# Patient Record
Sex: MALE | Race: WHITE | NOT HISPANIC OR LATINO | Employment: OTHER | URBAN - NONMETROPOLITAN AREA
[De-identification: names, ages, dates, MRNs, and addresses within clinical notes are randomized per-mention and may not be internally consistent; named-entity substitution may affect disease eponyms.]

---

## 2024-09-30 ENCOUNTER — OFFICE VISIT (OUTPATIENT)
Dept: CARDIOLOGY | Facility: CLINIC | Age: 74
End: 2024-09-30
Payer: MEDICARE

## 2024-09-30 VITALS
HEART RATE: 67 BPM | BODY MASS INDEX: 29.03 KG/M2 | DIASTOLIC BLOOD PRESSURE: 86 MMHG | WEIGHT: 196 LBS | OXYGEN SATURATION: 98 % | SYSTOLIC BLOOD PRESSURE: 138 MMHG | HEIGHT: 69 IN

## 2024-09-30 DIAGNOSIS — G47.19 EXCESSIVE DAYTIME SLEEPINESS: ICD-10-CM

## 2024-09-30 DIAGNOSIS — I10 PRIMARY HYPERTENSION: ICD-10-CM

## 2024-09-30 DIAGNOSIS — G47.33 OSA (OBSTRUCTIVE SLEEP APNEA): Primary | ICD-10-CM

## 2024-09-30 PROCEDURE — 3079F DIAST BP 80-89 MM HG: CPT | Performed by: NURSE PRACTITIONER

## 2024-09-30 PROCEDURE — 1160F RVW MEDS BY RX/DR IN RCRD: CPT | Performed by: NURSE PRACTITIONER

## 2024-09-30 PROCEDURE — 1159F MED LIST DOCD IN RCRD: CPT | Performed by: NURSE PRACTITIONER

## 2024-09-30 PROCEDURE — 99204 OFFICE O/P NEW MOD 45 MIN: CPT | Performed by: NURSE PRACTITIONER

## 2024-09-30 PROCEDURE — 3075F SYST BP GE 130 - 139MM HG: CPT | Performed by: NURSE PRACTITIONER

## 2024-09-30 RX ORDER — IBUPROFEN 800 MG/1
800 TABLET, FILM COATED ORAL 3 TIMES DAILY
COMMUNITY
Start: 2024-09-26

## 2024-09-30 RX ORDER — CETIRIZINE HYDROCHLORIDE 10 MG/1
10 TABLET ORAL DAILY
COMMUNITY

## 2024-09-30 RX ORDER — PRAVASTATIN SODIUM 40 MG
40 TABLET ORAL DAILY
COMMUNITY
Start: 2024-09-26

## 2024-09-30 RX ORDER — PANTOPRAZOLE SODIUM 40 MG/1
40 TABLET, DELAYED RELEASE ORAL DAILY
COMMUNITY
Start: 2024-09-26

## 2024-09-30 RX ORDER — PROPRANOLOL HCL 10 MG
5 TABLET ORAL
COMMUNITY
Start: 2024-09-26

## 2024-09-30 NOTE — ASSESSMENT & PLAN NOTE
Patient reports that he is frequently fatigued and drowsy during the day.    He has an [place] sleepiness scale of 12.    He is a CPAP user but noted that his PAP device may be approaching 9 years old.  In addition he has not recently had any new CPAP supplies.    Plan:    Prescription to DME of patient's choice for new CPAP device and CPAP supplies.    Reevaluate symptoms on new CPAP more than 1 month.

## 2024-09-30 NOTE — ASSESSMENT & PLAN NOTE
Known history of sleep apnea.  Original study was years ago at [facility] in [facility].    He is on CPAP therapy.  Download reviewed with good control and good compliance.    He is benefiting from PAP therapy.  We plan to continue PAP therapy.    Noted that his device is a system 1 and may be near 9 years old.  He thought his device last was replaced in 2015.    He reports that he needs a new machine and supplies.    Plan:    Prescription to DME of patient's choice for new auto CPAP 10 to 16 cm and CPAP supplies.    Follow-up for a visit on new CPAP when he is had it more than 1 month but less than 3 months.    Discussed that original sleep study may need to be located to get a new Pap.  If unable to find original sleep study then we may need to repeat an in lab study for a baseline AHI.  Patient is willing to go to [facility] if needed

## 2024-09-30 NOTE — ASSESSMENT & PLAN NOTE
Blood pressure today 138/86.  This is borderline control.    He is advised to continue his current blood pressure medication regimen.  He is advised to continue to follow-up with his prescribing provider.  He is advised to have his very old CPAP replaced as untreated or undertreated sleep apnea may potentiate his hypertension.    We will plan to recheck blood pressure at next follow-up visit

## 2024-09-30 NOTE — PROGRESS NOTES
New Sleep Consult     Date:   2024  Name: Rufino Brand  :   1950  PCP: Kenneth Auguste DO    Chief Complaint   Patient presents with    Establish Care     Neck size: 16in       Subjective     History of Present Illness  Rufino Brand is a 74 y.o. male who presents today for establishing care as a new sleep apnea patient.  He reports that he has a long history of sleep apnea.  He had seen Dr. Childress at [facility] in the past.  Reports his CPAP was replaced about  and he has been using the same device since that time.  He reports that he buys his supplies for cash at times but he has not had a new mask for a while.  He reports he needs a new mask immediately and would like to also have a new PAP device.    He reports that without his PAP machine that he has difficulty falling asleep, difficulty staying asleep, snoring loudly and continuously every night.  He reports that with his CPAP device he does much better and sleeps better.    He reports that he goes to bed around 10:30 PM and he will awaken for the day around 5:30 AM.  He reports he normally sleeps about 7 hours per night.  He is retired.    Device download:            Sleep history and testing:    LAN unknown baseline on sleep study at [facility] and [facility].    Current LAN therapy auto CPAP 10 to 16 cm    Coexisting conditions: Hypertension, hyperlipidemia, heart block status post pacemaker    Further details are as follows:    Neck Measurement: 16  inches    Tonica Scale is (out of 24):  12.  He reports a slight chance of dozing off if he is sitting in active in public or in a meeting, if he is riding as a passenger in the car or sitting quietly after lunch.  He reports a moderate chance of dozing off if he is sitting and reading, watching television or if he went and lay down in his bed if the circumstances permitted.    Estimated average amount of sleep per night: 7  Number of times  he wakes up at night: 2  Difficulty falling back asleep: no  It usually takes 30 minutes to go to sleep.  He feels sleepy upon waking up: no  Rotating or night shift work: no    Drowsiness/Sleepiness:  He exhibits the following:  excessive daytime sleepiness  excessive daytime fatigue  falls asleep watching TV  falls asleep during times of the day when he is quiet    Snoring/Breathing:  He exhibits the following:  loud snoring, snores in all sleep positions, and quits breathing at night    Head Injury:  He exhibits the following:  No    Reflux:  He describes the following:  eats 2 meals daily     Narcolepsy:  He exhibits the following:  none    RLS/PLMs:  He describes the following:  none    Insomnia:  He describes the following: Only a problem without CPAP  problems initiating sleep at night  frequent awakenings  bothered by pain at night    Parasomnia:  He exhibits the following:  grinds teeth    Weight:       09/30/24  1255   Weight: 88.9 kg (196 lb)      Weight change in the last year: Patient reports weight is stable.      The patient's relevant past medical, surgical, family, and social history reviewed and updated in Epic as appropriate.    Past Medical History:   Diagnosis Date    Coronary artery disease     Diabetes mellitus     GERD (gastroesophageal reflux disease)     Hyperlipidemia     Hypertension     Prostate cancer      Past Surgical History:   Procedure Laterality Date    CARDIAC CATHETERIZATION      CYSTOSCOPY W/ LASER LITHOTRIPSY      FLEXIBLE SIGMOIDOSCOPY      PACEMAKER IMPLANTATION         No Known Allergies  Prior to Admission medications    Medication Sig Start Date End Date Taking? Authorizing Provider   ibuprofen (ADVIL,MOTRIN) 800 MG tablet Take 1 tablet by mouth 3 (Three) Times a Day. 9/26/24  Yes ProviderMelany MD   pantoprazole (PROTONIX) 40 MG EC tablet Take 1 tablet by mouth Daily. 9/26/24  Yes ProviderMelany MD   pravastatin (PRAVACHOL) 40 MG tablet Take 1 tablet by  "mouth Daily. 9/26/24  Yes Melany Randolph MD   propranolol (INDERAL) 10 MG tablet Take 0.5 tablets by mouth. 9/26/24  Yes Melany Randolph MD   Aspirin 81 MG capsule Take 1 capsule by mouth Daily.    ProviderMelany MD   cetirizine (zyrTEC) 10 MG tablet Take 1 tablet by mouth Daily.    ProviderMelany MD     Family History   Problem Relation Age of Onset    Cancer Mother     Heart failure Father     Hypertension Father     Heart disease Father     Cancer Father     Hyperlipidemia Father     Alzheimer's disease Father     Coronary artery disease Father     Heart disease Brother        Objective     Vital Signs:  /86   Pulse 67   Ht 175.3 cm (69\")   Wt 88.9 kg (196 lb)   SpO2 98%   BMI 28.94 kg/m²     BMI is >= 25 and <30. (Overweight) The following options were offered after discussion;: referral to primary care        Physical Exam  Constitutional:       Appearance: Normal appearance. He is well-developed.   HENT:      Head: Normocephalic and atraumatic.      Nose: Nose normal.      Mouth/Throat:      Mouth: Mucous membranes are moist.   Eyes:      General: No scleral icterus.     Pupils: Pupils are equal, round, and reactive to light.   Neck:      Vascular: No carotid bruit.   Cardiovascular:      Rate and Rhythm: Normal rate and regular rhythm.      Pulses: Normal pulses.           Radial pulses are 2+ on the right side and 2+ on the left side.        Dorsalis pedis pulses are 2+ on the right side and 2+ on the left side.        Posterior tibial pulses are 2+ on the right side and 2+ on the left side.      Heart sounds: Normal heart sounds. No murmur heard.  Pulmonary:      Effort: Pulmonary effort is normal.      Breath sounds: Normal breath sounds. No wheezing or rhonchi.   Abdominal:      General: Bowel sounds are normal.   Musculoskeletal:      Right lower leg: No edema.      Left lower leg: No edema.   Skin:     General: Skin is warm and dry.      Capillary Refill: Capillary " refill takes less than 2 seconds.      Coloration: Skin is not cyanotic.      Nails: There is no clubbing.   Neurological:      Mental Status: He is alert and oriented to person, place, and time.      Motor: No weakness.      Gait: Gait normal.   Psychiatric:         Mood and Affect: Mood normal.         Behavior: Behavior normal. Behavior is cooperative.         Thought Content: Thought content normal.         Cognition and Memory: Memory normal.         The following data was reviewed by: DAVONTE Jones on 09/30/2024:    Labs reviewed: 9/30/2024 lipids, CMP, TSH, PAP download reviewed: 30-day download as above.  I have reviewed and interpreted the data on the download at today's visit, and 9/26/2024 office visit from family physician Dr. Kenneth Auguste          Assessment and Plan     Rufino Brand is a 74 y.o. male who presents for further evaluation of known history of sleep apnea with a very old PAP device.  Concerns his PAP device may not be functioning well and does not have appropriately functioning equipment.  We will order a new CPAP and attempt to obtain records from past sleep apnea testing for you.  The patient will return for follow-up on new CPAP.  I have discussed weight loss as it pertains to obstructive sleep apnea.    Diagnoses and all orders for this visit:    1. LAN (obstructive sleep apnea) (Primary)  Assessment & Plan:  Known history of sleep apnea.  Original study was years ago at [facility] in [facility].    He is on CPAP therapy.  Download reviewed with good control and good compliance.    He is benefiting from PAP therapy.  We plan to continue PAP therapy.    Noted that his device is a system 1 and may be near 9 years old.  He thought his device last was replaced in 2015.    He reports that he needs a new machine and supplies.    Plan:    Prescription to DME of patient's choice for new auto CPAP 10 to 16 cm and CPAP supplies.    Follow-up for a visit on new  CPAP when he is had it more than 1 month but less than 3 months.    Discussed that original sleep study may need to be located to get a new Pap.  If unable to find original sleep study then we may need to repeat an in lab study for a baseline AHI.  Patient is willing to go to [facility] if needed    Orders:  -     PAP Therapy    2. Excessive daytime sleepiness  Assessment & Plan:  Patient reports that he is frequently fatigued and drowsy during the day.    He has an [place] sleepiness scale of 12.    He is a CPAP user but noted that his PAP device may be approaching 9 years old.  In addition he has not recently had any new CPAP supplies.    Plan:    Prescription to DME of patient's choice for new CPAP device and CPAP supplies.    Reevaluate symptoms on new CPAP more than 1 month.      3. Primary hypertension  Assessment & Plan:  Blood pressure today 138/86.  This is borderline control.    He is advised to continue his current blood pressure medication regimen.  He is advised to continue to follow-up with his prescribing provider.  He is advised to have his very old CPAP replaced as untreated or undertreated sleep apnea may potentiate his hypertension.    We will plan to recheck blood pressure at next follow-up visit          I discussed the consequences of uncontrolled sleep apnea including hypertension, heart disease, diabetes, stroke, and dementia. I further discussed sleep apnea therapeutic options including CPAP, Weight loss, Oral dental appliance, and surgery.    Report if any new/changing symptoms immediately and Sleep risks reviewed (driving, medical, sleep death, sedating agents)         Follow Up  Return in about 2 months (around 12/14/2024) for LAN.  Patient was given instructions and counseling regarding his condition or for health maintenance advice. Please see specific information pulled into the AVS if appropriate.

## 2024-12-18 ENCOUNTER — OFFICE VISIT (OUTPATIENT)
Dept: CARDIOLOGY | Facility: CLINIC | Age: 74
End: 2024-12-18
Payer: MEDICARE

## 2024-12-18 VITALS
OXYGEN SATURATION: 98 % | HEIGHT: 69 IN | HEART RATE: 74 BPM | SYSTOLIC BLOOD PRESSURE: 122 MMHG | DIASTOLIC BLOOD PRESSURE: 64 MMHG | WEIGHT: 202 LBS | BODY MASS INDEX: 29.92 KG/M2

## 2024-12-18 DIAGNOSIS — G47.33 OSA (OBSTRUCTIVE SLEEP APNEA): Primary | ICD-10-CM

## 2024-12-18 PROCEDURE — 3074F SYST BP LT 130 MM HG: CPT | Performed by: NURSE PRACTITIONER

## 2024-12-18 PROCEDURE — 99213 OFFICE O/P EST LOW 20 MIN: CPT | Performed by: NURSE PRACTITIONER

## 2024-12-18 PROCEDURE — 3078F DIAST BP <80 MM HG: CPT | Performed by: NURSE PRACTITIONER

## 2024-12-18 RX ORDER — CLONIDINE HYDROCHLORIDE 0.1 MG/1
TABLET ORAL
COMMUNITY
Start: 2024-12-10

## 2024-12-18 RX ORDER — AMLODIPINE BESYLATE 10 MG/1
5 TABLET ORAL
COMMUNITY
Start: 2024-12-10

## 2024-12-18 NOTE — ASSESSMENT & PLAN NOTE
He has a known history of sleep apnea.  Baseline AHI is unknown.    He is on a new CPAP therapy. He has been on CPAP more than 1 month but less than 3 months.    CPAP download is reviewed with good control and good compliance.    He is benefiting from PAP therapy.    We plan to continue PAP therapy.    Prescription to DME of patient's choice for slight adjustments.  He is having a problem with moisture in his tube and feeling like the start up pressure is too low.  Prescription for ramp adjustment to increase it to 6 cm x 15 minutes, prescription to adjust his humidifier down and if needed changing to a heated tube to prevent rainout in tubing.  Also his old device had comfort features such as auto on an auto off.  He wishes to have comfort features adjusted on new device.    He reports that he has to press the on button twice to get it to come on.  He reports when he presses the button to turn it off that at times it continues to run.  We discussed that he should report these things to his DME and after having comfort features adjusted on see if this improves these problems.    Plan follow-up in 9 months for LAN on CPAP therapy.

## 2024-12-18 NOTE — PROGRESS NOTES
Follow-Up Sleep Consult     Date:   2024  Name: Rufino Brand  :   1950  PCP: Kenneth Auguste DO    Chief Complaint   Patient presents with    Sleep Apnea       Subjective     History of Present Illness  Rufino Brand is a 74 y.o. male who presents today for follow-up on LAN.  He comes back in for scheduled follow-up visit on new CPAP therapy.    He reports he has had his new CPAP for more than 1 month but less than 3 months.  He is very satisfied with new CPAP.  He reports new CPAP is very quiet.  He reports that he is sleeping better.  He is feeling more rested.  And his excessive daytime sleepiness has improved.    He has problem of having moisture in his tube and he is getting wet every night. He has regular tubing and his humidifier is set for 4.  Discussed turn humidifier down to 3. We can consider changing him to heated tubing.     He also reports he has to press the on button twice to get it to come on.  Then he reports when he is done using it he presses the off button but sometimes it continues to run.    Sleep history and testing:    LAN unknown baseline on sleep study at [facility] and [facility].    Current LAN therapy auto CPAP 10 to 16 cm    Coexisting conditions: Hypertension, hyperlipidemia, heart block status post pacemaker      Current mask used is FFM    Device Functioning Well: Yes  Mask Fit Comfortable: Yes  Air Flow Comfortable: Yes - But too low in the beginning  DME Helpful for Supplies: Yes  Sleep is rested: Yes    Device Download:                 The patient's relevant past medical, surgical, family, and social history reviewed and updated in Epic as appropriate.    Past Medical History:   Diagnosis Date    Coronary artery disease     Diabetes mellitus     GERD (gastroesophageal reflux disease)     Hyperlipidemia     Hypertension     Prostate cancer      Past Surgical History:   Procedure Laterality Date    CARDIAC CATHETERIZATION       "CYSTOSCOPY W/ LASER LITHOTRIPSY      FLEXIBLE SIGMOIDOSCOPY      PACEMAKER IMPLANTATION         No Known Allergies  Prior to Admission medications    Medication Sig Start Date End Date Taking? Authorizing Provider   amLODIPine (NORVASC) 10 MG tablet Take 0.5 tablets by mouth. 12/10/24  Yes Melany Randolph MD   Aspirin 81 MG capsule Take 1 capsule by mouth Daily.   Yes Melany Randolph MD   cetirizine (zyrTEC) 10 MG tablet Take 1 tablet by mouth Daily.   Yes Melany Randolph MD   cloNIDine (CATAPRES) 0.1 MG tablet Take one tab PO as needed up to twice daily for bp >195/>95. 12/10/24  Yes Melany Randolph MD   ibuprofen (ADVIL,MOTRIN) 800 MG tablet Take 1 tablet by mouth 3 (Three) Times a Day. 9/26/24  Yes Provider, Historical, MD   metFORMIN (GLUCOPHAGE) 500 MG tablet TAKE ONE TABLET BY MOUTH DAILY WITH BREAKFAST FOR SUGAR CONTROL 12/2/24  Yes Melany Randolph MD   pantoprazole (PROTONIX) 40 MG EC tablet Take 1 tablet by mouth Daily. 9/26/24  Yes Melany Randolph MD   pravastatin (PRAVACHOL) 40 MG tablet Take 1 tablet by mouth Daily. 9/26/24  Yes Melany Randolph MD   propranolol (INDERAL) 10 MG tablet Take 0.5 tablets by mouth. 9/26/24  Yes Melany Randolph MD     Family History   Problem Relation Age of Onset    Cancer Mother     Heart failure Father     Hypertension Father     Heart disease Father     Cancer Father     Hyperlipidemia Father     Alzheimer's disease Father     Coronary artery disease Father     Heart disease Brother        Objective     Vital Signs:  /64 (BP Location: Right arm, Patient Position: Sitting, Cuff Size: Large Adult)   Pulse 74   Ht 175.3 cm (69\")   Wt 91.6 kg (202 lb)   SpO2 98%   BMI 29.83 kg/m²              Physical Exam  HENT:      Head: Normocephalic.      Nose: Nose normal.      Mouth/Throat:      Mouth: Mucous membranes are moist.   Pulmonary:      Effort: Pulmonary effort is normal.   Skin:     General: Skin is warm and dry. "   Neurological:      Mental Status: He is alert and oriented to person, place, and time.   Psychiatric:         Mood and Affect: Mood normal.         Behavior: Behavior normal.         Thought Content: Thought content normal.         The following data was reviewed by: DAVONTE Jones on 12/18/2024:    PAP download reviewed: 30-day download as above.  I have reviewed and interpreted the data on the download at today's visit.         Assessment and Plan     Diagnoses and all orders for this visit:    1. LAN (obstructive sleep apnea) (Primary)  Assessment & Plan:  He has a known history of sleep apnea.  Baseline AHI is unknown.    He is on a new CPAP therapy. He has been on CPAP more than 1 month but less than 3 months.    CPAP download is reviewed with good control and good compliance.    He is benefiting from PAP therapy.    We plan to continue PAP therapy.    Prescription to DME of patient's choice for slight adjustments.  He is having a problem with moisture in his tube and feeling like the start up pressure is too low.  Prescription for ramp adjustment to increase it to 6 cm x 15 minutes, prescription to adjust his humidifier down and if needed changing to a heated tube to prevent rainout in tubing.  Also his old device had comfort features such as auto on an auto off.  He wishes to have comfort features adjusted on new device.    He reports that he has to press the on button twice to get it to come on.  He reports when he presses the button to turn it off that at times it continues to run.  We discussed that he should report these things to his DME and after having comfort features adjusted on see if this improves these problems.    Plan follow-up in 9 months for LAN on CPAP therapy.    Orders:  -     PAP Therapy        Report if any new/changing symptoms immediately         Follow Up  Return in about 9 months (around 9/18/2025) for LAN.  Patient was given instructions and counseling regarding his  condition or for health maintenance advice. Please see specific information pulled into the AVS if appropriate.

## 2025-01-14 ENCOUNTER — TELEPHONE (OUTPATIENT)
Dept: CARDIOLOGY | Facility: CLINIC | Age: 75
End: 2025-01-14
Payer: MEDICARE

## 2025-01-14 NOTE — TELEPHONE ENCOUNTER
PATIENT WAS LAST SEEN 12/18/2024 FOR LAN.    PATIENT CURRENTLY SEES [facility] FOR CARDIAC CARE AND THEY WILL PROVIDE CARDIAC CLEARANCE. THEY ARE NEEDING SLEEP APNEA CLEARANCE FROM US.     UPDATED DOWNLOAD SCANNED INTO CHART.     ATTEMPTED TO CALL PATIENT TO SEE IF HE IS EXPERIENCING ANY SYMPTOMS RELATED TO SLEEP APNEA. LVM FOR PATIENT TO CALL BACK.

## 2025-01-14 NOTE — TELEPHONE ENCOUNTER
Call patient and let him know that I have sent a letter for sleep apnea clearance for upcoming surgery.  Remind him that anesthesia and sedating medications may potentiate (to make worse ) his sleep apnea.  So he is encouraged to use his CPAP during all hours of sleep especially if he is giving medication for pain after his surgery.  My note advises for anesthesia precautions for sleep apnea.  Let him know I will be thinking about him during this surgery and recovery.  And I am hopeful that he does well.

## 2025-01-14 NOTE — TELEPHONE ENCOUNTER
REQUEST FOR CARDIAC CLEARANCE    Caller name: SARABJIT WEISS KATHY GOODMAN      Phone Number: [phone]     Surgeon's name: MERLYN CABALLERO     Type of planned surgery: RIGHT TOTAL KNEE     Date of planned surgery: 2/3/25    Type of anesthesia: GENERAL     Have you been experiencing chest pain or shortness of breath? NO PER CALLER     Is your doctor requesting for you to stop any of your medications prior to your surgery? YES BUT NOT FROM OUR OFFICE     Where should we fax the clearance to? [phone]      SAW PROVIDER ON 12/18 FOR SLEEP APNEA